# Patient Record
Sex: MALE | Race: WHITE | NOT HISPANIC OR LATINO | URBAN - NONMETROPOLITAN AREA
[De-identification: names, ages, dates, MRNs, and addresses within clinical notes are randomized per-mention and may not be internally consistent; named-entity substitution may affect disease eponyms.]

---

## 2018-09-05 NOTE — PATIENT DISCUSSION
BLEPHARITIS, OU: PRESCRIBE WARM COMPRESSES AND EYELID SCRUBS QD-BID, ARTIFICIAL TEARS BID-QID, THE DAILY INTAKE OF OMEGA-3 FATTY ACIDS RETURN FOR FOLLOW-UP AS SCHEDULED.

## 2018-09-05 NOTE — PATIENT DISCUSSION
RLL GERMAINEOLUM: STABLE. CALL BACK INSTRUCTIONS GIVEN. RECOMMEND BIOPSY IF BECOMES BOTHERSOME TO THE PATIENT.

## 2018-09-05 NOTE — PATIENT DISCUSSION
New Prescription: Pazeo (olopatadine): drops: 0.7% 1 drop every morning as needed into both eyes 09-

## 2018-09-05 NOTE — PATIENT DISCUSSION
CHALAZION OD: PRESCRIBED WARM COMPRESSES, EYELID SCRUBS AND DOXY 100 MG BID PO X 2 WEEKS AND TOBRADEX BRITTON QHS OU X 2 WEEKS.

## 2018-09-05 NOTE — PATIENT DISCUSSION
New Prescription: doxycycline hyclate (doxycycline hyclate): tablet: 100 mg 1 tablet twice a day as directed by mouth 09-

## 2018-09-05 NOTE — PATIENT DISCUSSION
New Prescription: TobraDex (tobramycin-dexamethasone): ointment: 0.3-0.1% 1 a small amount at bedtime as directed into both eyes 09-

## 2019-09-14 ENCOUNTER — IMPORTED ENCOUNTER (OUTPATIENT)
Dept: URBAN - NONMETROPOLITAN AREA CLINIC 1 | Facility: CLINIC | Age: 69
End: 2019-09-14

## 2019-09-14 PROBLEM — H25.813: Noted: 2019-09-14

## 2019-09-14 PROBLEM — E11.9: Noted: 2019-09-14

## 2019-09-14 PROCEDURE — 92004 COMPRE OPH EXAM NEW PT 1/>: CPT

## 2019-09-14 NOTE — PATIENT DISCUSSION
Cataract(s)-Visually significant cataract OS>OD. -Cataract(s) causing symptomatic impairment of visual function not correctable with a tolerable change in glasses or contact lenses lighting or non-operative means resulting in specific activity limitations and/or participation restrictions including but not limited to reading viewing television driving or meeting vocational or recreational needs. -Expectation is clearer vision and functional improvement in symptoms as well as reduced glare disability after cataract removal.-Order IOLMaster and OPD today. -Recommend standard/traditional based on today's OPD testing and lifestyle questionnaire.-All questions were answered regarding surgery including pre and post-op medications appointments activity restrictions and anesthetic usage.-The risks benefits and alternatives and special risk factors for the patient were discussed in detail including but not limited to: bleeding infection retinal detachment vitreous loss problems with the implant and possible need for additional surgery.-Although rare the possibility of complete vision loss was discussed.-The possible need for glasses post-operatively was discussed.-Order medical clearance exam based on history of DM HTN and age-Patient elects to proceed with cataract surgery OS. Will schedule at patient's convenience and re-evaluate OD  in the future.

## 2019-10-23 PROBLEM — E11.9: Noted: 2019-10-23

## 2019-10-23 PROBLEM — H25.813: Noted: 2019-10-23

## 2019-10-30 ENCOUNTER — IMPORTED ENCOUNTER (OUTPATIENT)
Dept: URBAN - NONMETROPOLITAN AREA CLINIC 1 | Facility: CLINIC | Age: 69
End: 2019-10-30

## 2019-10-30 PROBLEM — E11.9: Noted: 2019-10-30

## 2019-10-30 PROBLEM — Z01.818: Noted: 2019-10-30

## 2019-10-30 PROBLEM — H25.813: Noted: 2019-10-30

## 2019-10-30 NOTE — PATIENT DISCUSSION
Medical Clearance-Medical clearance done today. -No outstanding concerns that would preclude surgery.-Referred pt to PCP for PVC

## 2019-11-11 ENCOUNTER — IMPORTED ENCOUNTER (OUTPATIENT)
Dept: URBAN - NONMETROPOLITAN AREA CLINIC 1 | Facility: CLINIC | Age: 69
End: 2019-11-11

## 2019-11-11 PROBLEM — E11.9: Noted: 2019-11-11

## 2019-11-11 PROBLEM — Z98.42: Noted: 2019-11-11

## 2019-11-11 PROBLEM — H25.813: Noted: 2019-11-11

## 2019-11-11 PROBLEM — Z01.818: Noted: 2019-11-11

## 2019-11-11 PROCEDURE — 66984 XCAPSL CTRC RMVL W/O ECP: CPT

## 2019-11-11 PROCEDURE — 92136 OPHTHALMIC BIOMETRY: CPT

## 2019-11-11 NOTE — PATIENT DISCUSSION
"Medical Clearance-Medical clearance done today. -No outstanding concerns that would preclude surgery.-Referred pt to PCP for PVC ""s/p PCIOL-Pt doing well s/p PCIOL. -Continue post-op gtts according to instruction sheet and sleep with eye shield over eye for 7 nights.-Avoid bending at the waist lifting anything over 5lbs and dirty or enrico environments. -RTC ."

## 2019-11-18 ENCOUNTER — IMPORTED ENCOUNTER (OUTPATIENT)
Dept: URBAN - NONMETROPOLITAN AREA CLINIC 1 | Facility: CLINIC | Age: 69
End: 2019-11-18

## 2019-11-18 PROBLEM — Z98.42: Noted: 2019-11-11

## 2019-11-18 PROBLEM — H25.811: Noted: 2019-11-18

## 2019-11-18 PROCEDURE — 99024 POSTOP FOLLOW-UP VISIT: CPT

## 2019-11-18 PROCEDURE — 99213 OFFICE O/P EST LOW 20 MIN: CPT

## 2019-11-18 NOTE — PATIENT DISCUSSION
Cataract(s)-Visually significant cataract OD. -Cataract(s) causing symptomatic impairment of visual function not correctable with a tolerable change in glasses or contact lenses lighting or non-operative means resulting in specific activity limitations and/or participation restrictions including but not limited to reading viewing television driving or meeting vocational or recreational needs. -Expectation is clearer vision and functional improvement in symptoms as well as reduced glare disability after cataract removal.-Recommend Stand/Trad based on previous OPD testing and lifestyle questionnaire.-All questions were answered regarding surgery including pre and post-op medications appointments activity restrictions and anesthetic usage.-The risks benefits and alternatives and special risk factors for the patient were discussed in detail including but not limited to: bleeding infection retinal detachment vitreous loss problems with the implant and possible need for additional surgery.-Although rare the possibility of complete vision loss was discussed.-The need for glasses post-operatively was discussed.-Patient elects to proceed with cataract surgery OD. Will schedule at patient's convenience. s/p PCIOL OS 11/11/19-Pt doing well at 1 week s/p PCIOL. -Continue post-op gtts according to instruction sheet.-Okay to resume usual activites and d/c eye shield.

## 2019-11-27 ENCOUNTER — IMPORTED ENCOUNTER (OUTPATIENT)
Dept: URBAN - NONMETROPOLITAN AREA CLINIC 1 | Facility: CLINIC | Age: 69
End: 2019-11-27

## 2019-11-27 PROBLEM — Z98.41: Noted: 2019-11-27

## 2019-11-27 PROBLEM — Z98.42: Noted: 2019-11-27

## 2019-11-27 PROCEDURE — 92136 OPHTHALMIC BIOMETRY: CPT

## 2019-11-27 PROCEDURE — 66984 XCAPSL CTRC RMVL W/O ECP: CPT

## 2019-11-27 NOTE — PATIENT DISCUSSION
s/p PCIOL-  discussed findings w/patient-  Pt doing well s/p PCIOL. -  Continue post-op gtts according to instruction sheet and sleep with eye shield over eye for 7 nights. -  Avoid bending at the waist lifting anything over 5lbs and dirty or enrico environments.-  RTC as scheduled or prn

## 2019-12-05 ENCOUNTER — IMPORTED ENCOUNTER (OUTPATIENT)
Dept: URBAN - NONMETROPOLITAN AREA CLINIC 1 | Facility: CLINIC | Age: 69
End: 2019-12-05

## 2019-12-05 PROCEDURE — 99024 POSTOP FOLLOW-UP VISIT: CPT

## 2019-12-05 NOTE — PATIENT DISCUSSION
s/p PC IOL-  discussed findings w/patient-  Pt doing well at 1 week s/p PCIOL. -  Continue post-op gtts according to instruction sheet.-  Okay to resume usual activites and d/c eye shield. -  RTC 3 weeks PORM or prn

## 2020-01-02 ENCOUNTER — IMPORTED ENCOUNTER (OUTPATIENT)
Dept: URBAN - NONMETROPOLITAN AREA CLINIC 1 | Facility: CLINIC | Age: 70
End: 2020-01-02

## 2020-01-02 PROCEDURE — 99024 POSTOP FOLLOW-UP VISIT: CPT

## 2020-01-02 PROCEDURE — 92015 DETERMINE REFRACTIVE STATE: CPT

## 2020-01-02 NOTE — PATIENT DISCUSSION
s/p PC IOL-  discussed findings w/patient-  patient is doing well at this time he has finished all of his drops-  new spectacle Rx issued-  continue to monitor at 3 mo DFE or prn; 's Notes: MR 1/2/2020DFE

## 2022-04-09 ASSESSMENT — VISUAL ACUITY
OD_PAM: 20/20
OS_CC: 20/40
OS_CC: 20/25
OD_GLARE: 20/80
OS_AM: 20/20
OD_PH: 20/30+
OS_CC: 20/80+
OD_SC: 20/400
OS_CC: 20/20
OD_CC: 20/20-1
OD_PAM: 20/20
OS_CC: 20/20
OD_CC: 20/20-2
OD_CC: 20/70 BLURRY
OD_PH: 20/25
OD_CC: J1
OD_PH: 20/25
OD_GLARE: 20/80
OD_CC: 20/60-
OS_CC: J1
OS_GLARE: 20/100
OS_PH: 20/25-2

## 2022-04-09 ASSESSMENT — TONOMETRY
OS_IOP_MMHG: 14
OS_IOP_MMHG: 05
OS_IOP_MMHG: 14
OS_IOP_MMHG: 12
OD_IOP_MMHG: 10
OD_IOP_MMHG: 14
OD_IOP_MMHG: 14
OS_IOP_MMHG: 14
OD_IOP_MMHG: 14
OD_IOP_MMHG: 14
OS_IOP_MMHG: 14